# Patient Record
Sex: MALE | Race: WHITE | NOT HISPANIC OR LATINO | Employment: UNEMPLOYED | URBAN - NONMETROPOLITAN AREA
[De-identification: names, ages, dates, MRNs, and addresses within clinical notes are randomized per-mention and may not be internally consistent; named-entity substitution may affect disease eponyms.]

---

## 2018-03-19 ENCOUNTER — OFFICE VISIT (OUTPATIENT)
Dept: ORTHOPEDIC SURGERY | Facility: CLINIC | Age: 28
End: 2018-03-19

## 2018-03-19 VITALS — BODY MASS INDEX: 36.29 KG/M2 | WEIGHT: 245 LBS | RESPIRATION RATE: 18 BRPM | HEIGHT: 69 IN

## 2018-03-19 DIAGNOSIS — M79.641 RIGHT HAND PAIN: Primary | ICD-10-CM

## 2018-03-19 DIAGNOSIS — S62.314A CLOSED DISPLACED FRACTURE OF BASE OF FOURTH METACARPAL BONE OF RIGHT HAND, INITIAL ENCOUNTER: Primary | ICD-10-CM

## 2018-03-19 DIAGNOSIS — S62.316A CLOSED DISPLACED FRACTURE OF BASE OF FIFTH METACARPAL BONE OF RIGHT HAND, INITIAL ENCOUNTER: ICD-10-CM

## 2018-03-19 PROCEDURE — 29125 APPL SHORT ARM SPLINT STATIC: CPT | Performed by: PHYSICIAN ASSISTANT

## 2018-03-19 PROCEDURE — 99203 OFFICE O/P NEW LOW 30 MIN: CPT | Performed by: PHYSICIAN ASSISTANT

## 2018-03-19 RX ORDER — IBUPROFEN 800 MG/1
800 TABLET ORAL
COMMUNITY
End: 2022-02-01

## 2018-03-19 RX ORDER — OXYCODONE AND ACETAMINOPHEN 10; 325 MG/1; MG/1
TABLET ORAL
COMMUNITY
Start: 2018-03-15 | End: 2018-03-20
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
## 2021-10-11 ENCOUNTER — APPOINTMENT (OUTPATIENT)
Dept: GENERAL RADIOLOGY | Facility: HOSPITAL | Age: 31
End: 2021-10-11
Payer: MEDICAID

## 2021-10-11 ENCOUNTER — APPOINTMENT (OUTPATIENT)
Dept: CT IMAGING | Facility: HOSPITAL | Age: 31
End: 2021-10-11
Payer: MEDICAID

## 2021-10-11 ENCOUNTER — HOSPITAL ENCOUNTER (EMERGENCY)
Facility: HOSPITAL | Age: 31
Discharge: LEFT BEFORE DISCHARGE | End: 2021-10-11
Attending: EMERGENCY MEDICINE
Payer: MEDICAID

## 2021-10-11 VITALS
DIASTOLIC BLOOD PRESSURE: 73 MMHG | HEIGHT: 69 IN | TEMPERATURE: 99.5 F | SYSTOLIC BLOOD PRESSURE: 125 MMHG | BODY MASS INDEX: 37.03 KG/M2 | WEIGHT: 250 LBS | OXYGEN SATURATION: 90 % | HEART RATE: 86 BPM | RESPIRATION RATE: 15 BRPM

## 2021-10-11 DIAGNOSIS — E87.6 HYPOKALEMIA: ICD-10-CM

## 2021-10-11 DIAGNOSIS — K92.0 COFFEE GROUND EMESIS: Primary | ICD-10-CM

## 2021-10-11 LAB
A/G RATIO: 1.2 (ref 0.8–2)
A/G RATIO: 1.2 (ref 0.8–2)
ALBUMIN SERPL-MCNC: 3.7 G/DL (ref 3.4–4.8)
ALBUMIN SERPL-MCNC: 4 G/DL (ref 3.4–4.8)
ALP BLD-CCNC: 120 U/L (ref 25–100)
ALP BLD-CCNC: 135 U/L (ref 25–100)
ALT SERPL-CCNC: 24 U/L (ref 4–36)
ALT SERPL-CCNC: 25 U/L (ref 4–36)
AMORPHOUS: NORMAL /HPF
AMPHETAMINE SCREEN, URINE: POSITIVE
ANION GAP SERPL CALCULATED.3IONS-SCNC: 8 MMOL/L (ref 3–16)
ANION GAP SERPL CALCULATED.3IONS-SCNC: 9 MMOL/L (ref 3–16)
AST SERPL-CCNC: 22 U/L (ref 8–33)
AST SERPL-CCNC: 24 U/L (ref 8–33)
BARBITURATE SCREEN URINE: ABNORMAL
BASOPHILS ABSOLUTE: 0 K/UL (ref 0–0.1)
BASOPHILS ABSOLUTE: 0 K/UL (ref 0–0.1)
BASOPHILS RELATIVE PERCENT: 0.1 %
BASOPHILS RELATIVE PERCENT: 0.3 %
BENZODIAZEPINE SCREEN, URINE: POSITIVE
BILIRUB SERPL-MCNC: 0.3 MG/DL (ref 0.3–1.2)
BILIRUB SERPL-MCNC: 0.4 MG/DL (ref 0.3–1.2)
BILIRUBIN URINE: NEGATIVE
BLOOD, URINE: NEGATIVE
BUN BLDV-MCNC: 16 MG/DL (ref 6–20)
BUN BLDV-MCNC: 18 MG/DL (ref 6–20)
BUPRENORPHINE QUAL, URINE: ABNORMAL
CALCIUM SERPL-MCNC: 8.9 MG/DL (ref 8.5–10.5)
CALCIUM SERPL-MCNC: 9.9 MG/DL (ref 8.5–10.5)
CANNABINOID SCREEN URINE: POSITIVE
CHLORIDE BLD-SCNC: 101 MMOL/L (ref 98–107)
CHLORIDE BLD-SCNC: 101 MMOL/L (ref 98–107)
CLARITY: CLEAR
CO2: 34 MMOL/L (ref 20–30)
CO2: 35 MMOL/L (ref 20–30)
COCAINE METABOLITE SCREEN URINE: POSITIVE
COLOR: YELLOW
CREAT SERPL-MCNC: 1 MG/DL (ref 0.4–1.2)
CREAT SERPL-MCNC: 1 MG/DL (ref 0.4–1.2)
EOSINOPHILS ABSOLUTE: 0 K/UL (ref 0–0.4)
EOSINOPHILS ABSOLUTE: 0.1 K/UL (ref 0–0.4)
EOSINOPHILS RELATIVE PERCENT: 0 %
EOSINOPHILS RELATIVE PERCENT: 0.8 %
GFR AFRICAN AMERICAN: >59
GFR AFRICAN AMERICAN: >59
GFR NON-AFRICAN AMERICAN: >59
GFR NON-AFRICAN AMERICAN: >59
GLOBULIN: 3.2 G/DL
GLOBULIN: 3.4 G/DL
GLUCOSE BLD-MCNC: 102 MG/DL (ref 74–106)
GLUCOSE BLD-MCNC: 132 MG/DL (ref 74–106)
GLUCOSE URINE: NEGATIVE MG/DL
HCT VFR BLD CALC: 40.1 % (ref 40–54)
HCT VFR BLD CALC: 43.2 % (ref 40–54)
HEMOGLOBIN: 13.1 G/DL (ref 13–18)
HEMOGLOBIN: 14.1 G/DL (ref 13–18)
IMMATURE GRANULOCYTES #: 0.1 K/UL
IMMATURE GRANULOCYTES #: 0.1 K/UL
IMMATURE GRANULOCYTES %: 0.5 % (ref 0–5)
IMMATURE GRANULOCYTES %: 0.6 % (ref 0–5)
KETONES, URINE: NEGATIVE MG/DL
LACTIC ACID: 1.8 MMOL/L (ref 0.4–2)
LACTIC ACID: 1.8 MMOL/L (ref 0.4–2)
LEUKOCYTE ESTERASE, URINE: NEGATIVE
LIPASE: 24 U/L (ref 5.6–51.3)
LYMPHOCYTES ABSOLUTE: 1.5 K/UL (ref 1.5–4)
LYMPHOCYTES ABSOLUTE: 2.5 K/UL (ref 1.5–4)
LYMPHOCYTES RELATIVE PERCENT: 10.8 %
LYMPHOCYTES RELATIVE PERCENT: 17.7 %
Lab: ABNORMAL
MAGNESIUM: 1.9 MG/DL (ref 1.7–2.4)
MAGNESIUM: 1.9 MG/DL (ref 1.7–2.4)
MCH RBC QN AUTO: 28.2 PG (ref 27–32)
MCH RBC QN AUTO: 28.3 PG (ref 27–32)
MCHC RBC AUTO-ENTMCNC: 32.6 G/DL (ref 31–35)
MCHC RBC AUTO-ENTMCNC: 32.7 G/DL (ref 31–35)
MCV RBC AUTO: 86.4 FL (ref 80–100)
MCV RBC AUTO: 86.6 FL (ref 80–100)
METHADONE SCREEN, URINE: ABNORMAL
METHAMPHETAMINE, URINE: POSITIVE
MICROSCOPIC EXAMINATION: YES
MONOCYTES ABSOLUTE: 0.7 K/UL (ref 0.2–0.8)
MONOCYTES ABSOLUTE: 1.2 K/UL (ref 0.2–0.8)
MONOCYTES RELATIVE PERCENT: 5.1 %
MONOCYTES RELATIVE PERCENT: 8 %
NEUTROPHILS ABSOLUTE: 10.5 K/UL (ref 2–7.5)
NEUTROPHILS ABSOLUTE: 11.7 K/UL (ref 2–7.5)
NEUTROPHILS RELATIVE PERCENT: 73.7 %
NEUTROPHILS RELATIVE PERCENT: 82.4 %
NITRITE, URINE: NEGATIVE
OCCULT BLOOD, OTHER: ABNORMAL
OPIATE SCREEN URINE: POSITIVE
PDW BLD-RTO: 13.2 % (ref 11–16)
PDW BLD-RTO: 13.2 % (ref 11–16)
PH UA: 8.5 (ref 5–8)
PHENCYCLIDINE SCREEN URINE: ABNORMAL
PLATELET # BLD: 234 K/UL (ref 150–400)
PLATELET # BLD: 246 K/UL (ref 150–400)
PMV BLD AUTO: 11.1 FL (ref 6–10)
PMV BLD AUTO: 11.1 FL (ref 6–10)
POTASSIUM REFLEX MAGNESIUM: 3 MMOL/L (ref 3.4–5.1)
POTASSIUM REFLEX MAGNESIUM: 3.1 MMOL/L (ref 3.4–5.1)
PRO-BNP: 593 PG/ML (ref 0–900)
PROPOXYPHENE SCREEN, URINE: ABNORMAL
PROTEIN UA: 30 MG/DL
RAPID INFLUENZA  B AGN: NEGATIVE
RAPID INFLUENZA A AGN: NEGATIVE
RBC # BLD: 4.63 M/UL (ref 4.5–6)
RBC # BLD: 5 M/UL (ref 4.5–6)
RBC UA: NORMAL /HPF (ref 0–4)
SARS-COV-2, NAAT: NOT DETECTED
SODIUM BLD-SCNC: 143 MMOL/L (ref 136–145)
SODIUM BLD-SCNC: 145 MMOL/L (ref 136–145)
SPECIFIC GRAVITY UA: 1.02 (ref 1–1.03)
TOTAL PROTEIN: 6.9 G/DL (ref 6.4–8.3)
TOTAL PROTEIN: 7.4 G/DL (ref 6.4–8.3)
TRICYCLIC, URINE: ABNORMAL
TROPONIN: <0.3 NG/ML
TROPONIN: <0.3 NG/ML
UR OXYCODONE RAPID SCREEN: ABNORMAL
URINE REFLEX TO CULTURE: ABNORMAL
URINE TYPE: ABNORMAL
UROBILINOGEN, URINE: 0.2 E.U./DL
WBC # BLD: 14.1 K/UL (ref 4–11)
WBC # BLD: 14.3 K/UL (ref 4–11)
WBC UA: NORMAL /HPF (ref 0–5)

## 2021-10-11 PROCEDURE — 83880 ASSAY OF NATRIURETIC PEPTIDE: CPT

## 2021-10-11 PROCEDURE — 80053 COMPREHEN METABOLIC PANEL: CPT

## 2021-10-11 PROCEDURE — 74176 CT ABD & PELVIS W/O CONTRAST: CPT

## 2021-10-11 PROCEDURE — 83690 ASSAY OF LIPASE: CPT

## 2021-10-11 PROCEDURE — 74220 X-RAY XM ESOPHAGUS 1CNTRST: CPT

## 2021-10-11 PROCEDURE — 87635 SARS-COV-2 COVID-19 AMP PRB: CPT

## 2021-10-11 PROCEDURE — 6360000002 HC RX W HCPCS: Performed by: EMERGENCY MEDICINE

## 2021-10-11 PROCEDURE — 96374 THER/PROPH/DIAG INJ IV PUSH: CPT

## 2021-10-11 PROCEDURE — 96365 THER/PROPH/DIAG IV INF INIT: CPT

## 2021-10-11 PROCEDURE — 80307 DRUG TEST PRSMV CHEM ANLYZR: CPT

## 2021-10-11 PROCEDURE — 36415 COLL VENOUS BLD VENIPUNCTURE: CPT

## 2021-10-11 PROCEDURE — 71045 X-RAY EXAM CHEST 1 VIEW: CPT

## 2021-10-11 PROCEDURE — 99284 EMERGENCY DEPT VISIT MOD MDM: CPT

## 2021-10-11 PROCEDURE — 2580000003 HC RX 258: Performed by: EMERGENCY MEDICINE

## 2021-10-11 PROCEDURE — 6370000000 HC RX 637 (ALT 250 FOR IP)

## 2021-10-11 PROCEDURE — 87040 BLOOD CULTURE FOR BACTERIA: CPT

## 2021-10-11 PROCEDURE — C9113 INJ PANTOPRAZOLE SODIUM, VIA: HCPCS | Performed by: EMERGENCY MEDICINE

## 2021-10-11 PROCEDURE — 85025 COMPLETE CBC W/AUTO DIFF WBC: CPT

## 2021-10-11 PROCEDURE — 96372 THER/PROPH/DIAG INJ SC/IM: CPT

## 2021-10-11 PROCEDURE — 84484 ASSAY OF TROPONIN QUANT: CPT

## 2021-10-11 PROCEDURE — 93005 ELECTROCARDIOGRAM TRACING: CPT

## 2021-10-11 PROCEDURE — 87804 INFLUENZA ASSAY W/OPTIC: CPT

## 2021-10-11 PROCEDURE — 83735 ASSAY OF MAGNESIUM: CPT

## 2021-10-11 PROCEDURE — 96375 TX/PRO/DX INJ NEW DRUG ADDON: CPT

## 2021-10-11 PROCEDURE — 82271 OCCULT BLOOD OTHER SOURCES: CPT

## 2021-10-11 PROCEDURE — 6360000002 HC RX W HCPCS

## 2021-10-11 PROCEDURE — 81001 URINALYSIS AUTO W/SCOPE: CPT

## 2021-10-11 PROCEDURE — 83605 ASSAY OF LACTIC ACID: CPT

## 2021-10-11 PROCEDURE — 36556 INSERT NON-TUNNEL CV CATH: CPT

## 2021-10-11 RX ORDER — DROPERIDOL 2.5 MG/ML
2.5 INJECTION, SOLUTION INTRAMUSCULAR; INTRAVENOUS ONCE
Status: COMPLETED | OUTPATIENT
Start: 2021-10-11 | End: 2021-10-11

## 2021-10-11 RX ORDER — ONDANSETRON 4 MG/1
TABLET, ORALLY DISINTEGRATING ORAL
Status: COMPLETED
Start: 2021-10-11 | End: 2021-10-11

## 2021-10-11 RX ORDER — PANTOPRAZOLE SODIUM 40 MG/10ML
INJECTION, POWDER, LYOPHILIZED, FOR SOLUTION INTRAVENOUS
Status: DISCONTINUED
Start: 2021-10-11 | End: 2021-10-11 | Stop reason: HOSPADM

## 2021-10-11 RX ORDER — 0.9 % SODIUM CHLORIDE 0.9 %
1000 INTRAVENOUS SOLUTION INTRAVENOUS ONCE
Status: COMPLETED | OUTPATIENT
Start: 2021-10-11 | End: 2021-10-11

## 2021-10-11 RX ORDER — ONDANSETRON 2 MG/ML
INJECTION INTRAMUSCULAR; INTRAVENOUS
Status: DISCONTINUED
Start: 2021-10-11 | End: 2021-10-11 | Stop reason: HOSPADM

## 2021-10-11 RX ORDER — POTASSIUM CHLORIDE 7.45 MG/ML
20 INJECTION INTRAVENOUS ONCE
Status: COMPLETED | OUTPATIENT
Start: 2021-10-11 | End: 2021-10-11

## 2021-10-11 RX ORDER — MORPHINE SULFATE 4 MG/ML
4 INJECTION, SOLUTION INTRAMUSCULAR; INTRAVENOUS ONCE
Status: DISCONTINUED | OUTPATIENT
Start: 2021-10-11 | End: 2021-10-11 | Stop reason: HOSPADM

## 2021-10-11 RX ORDER — ACETAMINOPHEN 500 MG
TABLET ORAL
Status: COMPLETED
Start: 2021-10-11 | End: 2021-10-11

## 2021-10-11 RX ORDER — ONDANSETRON 2 MG/ML
4 INJECTION INTRAMUSCULAR; INTRAVENOUS ONCE
Status: COMPLETED | OUTPATIENT
Start: 2021-10-11 | End: 2021-10-11

## 2021-10-11 RX ORDER — ONDANSETRON 4 MG/1
4 TABLET, ORALLY DISINTEGRATING ORAL ONCE
Status: COMPLETED | OUTPATIENT
Start: 2021-10-11 | End: 2021-10-11

## 2021-10-11 RX ORDER — DROPERIDOL 2.5 MG/ML
INJECTION, SOLUTION INTRAMUSCULAR; INTRAVENOUS
Status: COMPLETED
Start: 2021-10-11 | End: 2021-10-11

## 2021-10-11 RX ORDER — ONDANSETRON 4 MG/1
TABLET, ORALLY DISINTEGRATING ORAL
Status: DISCONTINUED
Start: 2021-10-11 | End: 2021-10-11 | Stop reason: HOSPADM

## 2021-10-11 RX ORDER — ACETAMINOPHEN 500 MG
1000 TABLET ORAL ONCE
Status: COMPLETED | OUTPATIENT
Start: 2021-10-11 | End: 2021-10-11

## 2021-10-11 RX ADMIN — ONDANSETRON 4 MG: 4 TABLET, ORALLY DISINTEGRATING ORAL at 08:15

## 2021-10-11 RX ADMIN — SODIUM CHLORIDE 1000 ML: 9 INJECTION, SOLUTION INTRAVENOUS at 08:46

## 2021-10-11 RX ADMIN — SODIUM CHLORIDE 1000 ML: 9 INJECTION, SOLUTION INTRAVENOUS at 16:39

## 2021-10-11 RX ADMIN — POTASSIUM CHLORIDE 20 MEQ: 10 INJECTION, SOLUTION INTRAVENOUS at 16:37

## 2021-10-11 RX ADMIN — ONDANSETRON 4 MG: 2 INJECTION INTRAMUSCULAR; INTRAVENOUS at 08:47

## 2021-10-11 RX ADMIN — DROPERIDOL 2.5 MG: 2.5 INJECTION, SOLUTION INTRAMUSCULAR; INTRAVENOUS at 10:18

## 2021-10-11 RX ADMIN — ACETAMINOPHEN 1000 MG: 500 TABLET, FILM COATED ORAL at 16:39

## 2021-10-11 RX ADMIN — Medication 1000 MG: at 16:39

## 2021-10-11 RX ADMIN — SODIUM CHLORIDE 40 MG: 9 INJECTION, SOLUTION INTRAVENOUS at 08:47

## 2021-10-11 ASSESSMENT — PAIN SCALES - GENERAL
PAINLEVEL_OUTOF10: 0
PAINLEVEL_OUTOF10: 6

## 2021-10-11 NOTE — ED PROVIDER NOTES
[facility] ENCOUNTER      Pt Name: Brit Fu  MRN: [id number]  YOB: 1990  Date of evaluation: 10/11/2021  Provider: Abdoulaye Thompson MD    CHIEF COMPLAINT       Chief Complaint   Patient presents with    Emesis     C/o n/v,onset yesterday. HISTORY OF PRESENT ILLNESS  (Location/Symptom, Timing/Onset, Context/Setting, Quality, Duration, Modifying Factors, Severity.)   Brit Fu is a 27 y.o. male who presents to the emergency department with several complaints. Patient states that for the last 2 days he has had intermittent nausea vomiting associated with epigastric abdominal pain. Patient states that he has been vomiting so hard that he strained the muscles in his chest wall and has chest wall pain. He describes it as a pulling muscle sensation in his chest wall worse with changes in position and palpation. He states that he is not around others with similar complaints. Patient states that he felt that he had a low-grade temperature. Patient is a smoker. Patient denies any change in vision or speech. He states that he has a dry mouth and feels that he is dehydrated. Nursing notes were reviewed. REVIEW OFSYSTEMS    (2-9 systems for level 4, 10 or more for level 5)   ROS:  General: Body aches, generalized weakness, subjective fever  Cardiovascular: Chest wall pain that is reproducible with changes in position and palpation  Respiratory:  No shortness of breath, no cough, no wheezing  Gastrointestinal: Upper abdominal pain associated with nausea and vomiting  Musculoskeletal:  No muscle pain, no joint pain  Skin:  No rash, no easy bruising  Neurologic:  No speech problems, no headache, no extremity weakness  Psychiatric:  No anxiety  Genitourinary:  No dysuria, no hematuria    Except as noted above the remainder of the review of systems was reviewed and negative.        PAST MEDICAL HISTORY Past Medical History:   Diagnosis Date    Drug abuse     Hep C w/ coma, chronic ([facility] [address] 2015    No treatments at the present         SURGICAL HISTORY     No past surgical history on file. CURRENT MEDICATIONS       Previous Medications    IBUPROFEN (ADVIL;MOTRIN) 800 MG TABLET    Take 800 mg by mouth every 8 hours as needed. ALLERGIES     Patient has no known allergies. FAMILY HISTORY       Family History   Problem Relation Age of Onset    Diabetes Maternal Grandmother     No Known Problems Mother     No Known Problems Father     No Known Problems Sister     No Known Problems Brother           SOCIAL HISTORY       Social History     Socioeconomic History    Marital status: Single     Spouse name: Not on file    Number of children: Not on file    Years of education: Not on file    Highest education level: Not on file   Occupational History    Not on file   Tobacco Use    Smoking status: Current Every Day Smoker     Packs/day: 1.50     Years: 10.00     Pack years: 15.00    Smokeless tobacco: Never Used   Vaping Use    Vaping Use: Never used   Substance and Sexual Activity    Alcohol use: No    Drug use: Yes     Types: Marijuana     Comment: weekly    Sexual activity: Not on file   Other Topics Concern    Not on file   Social History Narrative    Not on file     Social Determinants of Health     Financial Resource Strain:     Difficulty of Paying Living Expenses:    Food Insecurity:     Worried About Running Out of Food in the Last Year:     Ran Out of Food in the Last Year:    Transportation Needs:     Lack of Transportation (Medical):      Lack of Transportation (Non-Medical):    Physical Activity:     Days of Exercise per Week:     Minutes of Exercise per Session:    Stress:     Feeling of Stress :    Social Connections:     Frequency of Communication with Friends and Family:     Frequency of Social Gatherings with Friends and Family:     Attends [employer] Services:  Active Member of Clubs or Organizations:     Attends Club or Organization Meetings:     Marital Status:    Intimate Partner Violence:     Fear of Current or Ex-Partner:     Emotionally Abused:     Physically Abused:     Sexually Abused:          PHYSICAL EXAM    (up to 7 for level 4, 8 or more for level 5)     ED Triage Vitals   BP Temp Temp Source Pulse Resp SpO2 Height Weight   10/11/21 0558 10/11/21 0558 10/11/21 0558 10/11/21 0558 10/11/21 0558 10/11/21 0558 10/11/21 0554 10/11/21 0554   (!) 146/85 99.5 °F (37.5 °C) Oral 90 16 94 % 5' 9\" (1.753 m) 250 lb (113.4 kg)       Physical Exam  General :Patient is awake, alert, oriented, in no acute distress, nontoxic appearing  HEENT: Pupils are equally round and reactive to light, EOMI, conjunctivae clear. Mild dry oral mucosa, no exudate. Uvula is midline  Neck: Neck is supple, full range of motion, trachea midline  Cardiac: Heart regular rate, rhythm, no murmurs, rubs, or gallops  Lungs: Lungs are clear to auscultation, there is no wheezing, rhonchi, or rales. There is no use of accessory muscles. Chest wall: There is reproducible chest wall pain on palpation that recreates the symptoms of brought the patient in the emergency department  Abdomen: Abdomen is soft. Epigastric abdominal pain on palpation that recreates his symptoms of brought the patient into the emergency department. Increased bowel sounds all 4 quadrants. Musculoskeletal: 5 out of 5 strength in all 4 extremities. No focal muscle deficits are appreciated  Neuro: Motor intact, sensory intact, level of consciousness is normal, cerebellar function is normal, reflexes are grossly normal. No evidence of incontinence or loss of bowel or bladder function, no saddle anesthesia noted   Dermatology: Skin is warm and dry  Psych: Mentation is grossly normal, cognition is grossly normal. Affect is appropriate.       DIAGNOSTIC RESULTS     EKG: All EKG's are interpreted by the Emergency Department Physician who either signs or Co-signs this chart in the [address] a cardiologist.    The EKG interpreted by me shows normal sinus rhythm rate of 75 per EDMD    RADIOLOGY:   Non-plain film images such as CT, Ultrasound and MRI are read by the radiologist. Plain radiographic images are visualized and preliminarily interpreted by the emergency physician with the below findings:      ? Radiologist's Report Reviewed:  XR CHEST PORTABLE    (Results Pending)   CT ABDOMEN PELVIS WO CONTRAST Additional Contrast? None    (Results Pending)         ED BEDSIDE ULTRASOUND:   Performed by ED Physician - none    LABS:    I have reviewed and interpreted all of the currently available lab results from this visit (ifapplicable):  Results for orders placed or performed during the hospital encounter of 10/11/21   Rapid Influenza A/B Antigens    Specimen: Nasopharyngeal   Result Value Ref Range    Rapid Influenza A Ag Negative Negative    Rapid Influenza B Ag Negative Negative   COVID-19, Rapid    Specimen: Nasopharyngeal Swab   Result Value Ref Range    SARS-CoV-2, NAAT Not Detected Not Detected        All other labs were within normal range or not returned as of this dictation.     EMERGENCY DEPARTMENT COURSE and DIFFERENTIAL DIAGNOSIS/MDM:   Vitals:    Vitals:    10/11/21 0554 10/11/21 0558   BP:  (!) 146/85   Pulse:  90   Resp:  16   Temp:  99.5 °F (37.5 °C)   TempSrc:  Oral   SpO2:  94%   Weight: 250 lb (113.4 kg)    Height: 5' 9\" (1.753 m)        MEDICATIONS ADMINISTERED IN ED:  Medications   0.9 % sodium chloride bolus (has no administration in time range)   pantoprazole (PROTONIX) 40 mg in sodium chloride 0.9 % 50 mL bolus (has no administration in time range)   ondansetron (ZOFRAN) injection 4 mg (has no administration in time range)   morphine injection 4 mg (has no administration in time range)   pantoprazole (PROTONIX) 40 MG injection (has no administration in time range)       Patient was placed in examination room at which time after exam was performed IV was obtained and labs are drawn. Patient was given 1 L IV normal saline bolus along with 40 mg of Protonix IV, 4 mg of Zofran IV and 4 mg of morphine IV. EKG revealed normal sinus rhythm rate of 75 per EDMD.  Patient was given 324 mg aspirin p.o. Patient was taken over for CT scan abdomen and pelvis without contrast.  Patient's influenza and COVID-19 results were negative. Patient's care was turned over to Dr. Jatin Layton pending CT scan abdomen and pelvis, labs, reexamination definitive care at shift change    0700  I have signed out [facility] Emergency Department care to Dr Jatin Layton. We discussed the pertinent history, physical exam, completed/pending test results (CT scan abdomen and pelvis, labs, reexamination definitive care) and current treatment plan. Please refer to his/her chart for the patients remaining Emergency Department course and final disposition. The patient will follow-up with their PCP in 1-2 days for reevaluation. If the patient or family members have anyfurther concerns or any worsening symptoms they will return to the ED for reevaluation. CONSULTS:  None    PROCEDURES:  Procedures    CRITICAL CARE TIME    Total Critical Care time was 0 minutes, excluding separately reportable procedures. There was a high probability of clinically significant/life threatening deterioration in the patient's condition which required my urgent intervention. FINAL IMPRESSION      1. Nausea and vomiting, intractability of vomiting not specified, unspecified vomiting type Stable   2. Abdominal pain, epigastric Stable   3. Chest wall pain Stable   4. Musculoskeletal pain Stable         DISPOSITION/PLAN   DISPOSITION        PATIENT REFERRED TO:  No follow-up provider specified.     DISCHARGE MEDICATIONS:  New Prescriptions    No medications on file       Comment: Please note this report has been produced using speech recognition software and may contain errorsrelated to that system including errors in grammar, punctuation, and spelling, as well as words and phrases that may be inappropriate. If there are any questions or concerns please feel free to contact the dictating providerfor clarification.     China Jarquin MD  Attending Emergency Physician              China Jarquin MD  10/11/21 [id number]

## 2021-10-11 NOTE — ED NOTES
Central line pulled at this time while pt is laying flat on back, immediate direct pressure held on site with Vaseline Gauze and bulky gauze, [place] PCT will continue to hold direct constant pressure x 20-30 minutes then RN will apply pressure dressing. Reviewed risks again with pt and girlfriend. Also reviewed what to do if pt started to bleed heavily from site, instructing her to call 911 while holding constant pressure with clean towel or something of those sorts.  Girl friend and pt verbalize understand     Robinson Clark RN  10/11/21 1951

## 2021-10-11 NOTE — ED NOTES
Dr. Olesya Gilliland, performed Arterial stick to obtain blood sample, obtained from Right Radial, +ALLENS test prior to stick, direct pressure held to area for 30-60 seconds. Dressing applied no distress noted.         Gia Clark RN  10/11/21 1936

## 2021-10-11 NOTE — ED PROVIDER NOTES
Emergency Department Encounter  Location: [address] [place] [address] Court    Patient: Elfego Gibson  MRN: [id number]  : 1990  Date of evaluation:   ED Provider: Carlos Enrique Hankins MD    8600  Elfego Gibson was checked out to me by Dr. Ciro Barroso. Please see his/her initial documentation for details of the patient's initial ED presentation, physical exam and completed studies. In brief, Elfego Gibson is a 27 y.o. male that presented to the emergency department with abdominal pain, nausea, vomiting and chest wall pain from vomiting. He denies IVDU but has Hep C.       I have reviewed and interpreted all of the currently available lab results and diagnostics from this visit:  Results for orders placed or performed during the hospital encounter of 10/11/21   Rapid Influenza A/B Antigens    Specimen: Nasopharyngeal   Result Value Ref Range    Rapid Influenza A Ag Negative Negative    Rapid Influenza B Ag Negative Negative   COVID-19, Rapid    Specimen: Nasopharyngeal Swab   Result Value Ref Range    SARS-CoV-2, NAAT Not Detected Not Detected   CBC Auto Differential   Result Value Ref Range    WBC 14.1 (H) 4.0 - 11.0 K/uL    RBC 5.00 4.50 - 6.00 M/uL    Hemoglobin 14.1 13.0 - 18.0 g/dL    Hematocrit 43.2 40.0 - 54.0 %    MCV 86.4 80.0 - 100.0 fL    MCH 28.2 27.0 - 32.0 pg    MCHC 32.6 31.0 - 35.0 g/dL    RDW 13.2 11.0 - 16.0 %    Platelets 243 818 - 892 K/uL    MPV 11.1 (H) 6.0 - 10.0 fL    Neutrophils % 82.4 %    Immature Granulocytes % 0.6 0.0 - 5.0 %    Lymphocytes % 10.8 %    Monocytes % 5.1 %    Eosinophils % 0.8 %    Basophils % 0.3 %    Neutrophils Absolute 11.7 (H) 2.0 - 7.5 K/uL    Immature Granulocytes # 0.1 K/uL    Lymphocytes Absolute 1.5 1.5 - 4.0 K/uL    Monocytes Absolute 0.7 0.2 - 0.8 K/uL    Eosinophils Absolute 0.1 0.0 - 0.4 K/uL    Basophils Absolute 0.0 0.0 - 0.1 K/uL   Comprehensive Metabolic Panel w/ Reflex to MG   Result Value Ref Range Sodium 145 136 - 145 mmol/L    Potassium reflex Magnesium 3.0 (L) 3.4 - 5.1 mmol/L    Chloride 101 98 - 107 mmol/L    CO2 35 (H) 20 - 30 mmol/L    Anion Gap 9 3 - 16    Glucose 132 (H) 74 - 106 mg/dL    BUN 18 6 - 20 mg/dL    CREATININE 1.0 0.4 - 1.2 mg/dL    GFR Non-African American >59 >59    GFR African American >59 >59    Calcium 9.9 8.5 - 10.5 mg/dL    Total Protein 7.4 6.4 - 8.3 g/dL    Albumin 4.0 3.4 - 4.8 g/dL    Albumin/Globulin Ratio 1.2 0.8 - 2.0    Total Bilirubin 0.4 0.3 - 1.2 mg/dL    Alkaline Phosphatase 135 (H) 25 - 100 U/L    ALT 25 4 - 36 U/L    AST 24 8 - 33 U/L    Globulin 3.4 Not Established g/dL   Lipase   Result Value Ref Range    Lipase 24.0 5.6 - 51.3 U/L   Urine Reflex to Culture    Specimen: Urine, clean catch   Result Value Ref Range    Color, UA Yellow Straw/Yellow    Clarity, UA Clear Clear    Glucose, Ur Negative Negative mg/dL    Bilirubin Urine Negative Negative    Ketones, Urine Negative Negative mg/dL    Specific Gravity, UA 1.020 1.005 - 1.030    Blood, Urine Negative Negative    pH, UA 8.5 (A) 5.0 - 8.0    Protein, UA 30 (A) Negative mg/dL    Urobilinogen, Urine 0.2 <2.0 E.U./dL    Nitrite, Urine Negative Negative    Leukocyte Esterase, Urine Negative Negative    Microscopic Examination YES     Urine Type NotGiven     Urine Reflex to Culture Not Indicated    Troponin   Result Value Ref Range    Troponin <0.30 <0.30 ng/mL   Brain Natriuretic Peptide   Result Value Ref Range    Pro- 0 - 900 pg/mL   Lactic Acid, Plasma   Result Value Ref Range    Lactic Acid 1.8 0.4 - 2.0 mmol/L   Drug Screen, Multiple, Urine   Result Value Ref Range    PCP Screen, Urine Neg Negative <25 ng/mL    Benzodiazepine Screen, Urine POSITIVE (A) Negative <300 ng/mL    Cocaine Metabolite Screen, Urine POSITIVE (A) Negative <300 ng/mL    Amphetamine Screen, Urine POSITIVE (A) Negative <1000 ng/mL    Cannabinoid Scrn, Ur POSITIVE (A) Negative <50 ng/mL    Opiate Scrn, Ur POSITIVE (A) Negative <300 ng/mL    Barbiturate Screen, Ur Neg Negative <556 ng/mL    Tricyclic Neg Negative <106 ng/mL    Methadone Screen, Urine Neg Negative <300 ng/ml    Propoxyphene Screen, Urine Neg Negative <300 ng/mL    Methamphetamine, Urine POSITIVE (A) Negative <1000 ng/mL    UR Oxycodone Rapid Screen Neg Negative <100 ng/mL    Buprenorphine Qual, Urine Neg Negative <25 ng/mL    Drug Screen Comment: see below    Magnesium   Result Value Ref Range    Magnesium 1.9 1.7 - 2.4 mg/dL   Troponin   Result Value Ref Range    Troponin <0.30 <0.30 ng/mL   CBC Auto Differential   Result Value Ref Range    WBC 14.3 (H) 4.0 - 11.0 K/uL    RBC 4.63 4.50 - 6.00 M/uL    Hemoglobin 13.1 13.0 - 18.0 g/dL    Hematocrit 40.1 40.0 - 54.0 %    MCV 86.6 80.0 - 100.0 fL    MCH 28.3 27.0 - 32.0 pg    MCHC 32.7 31.0 - 35.0 g/dL    RDW 13.2 11.0 - 16.0 %    Platelets 073 827 - 199 K/uL    MPV 11.1 (H) 6.0 - 10.0 fL    Neutrophils % 73.7 %    Immature Granulocytes % 0.5 0.0 - 5.0 %    Lymphocytes % 17.7 %    Monocytes % 8.0 %    Eosinophils % 0.0 %    Basophils % 0.1 %    Neutrophils Absolute 10.5 (H) 2.0 - 7.5 K/uL    Immature Granulocytes # 0.1 K/uL    Lymphocytes Absolute 2.5 1.5 - 4.0 K/uL    Monocytes Absolute 1.2 (H) 0.2 - 0.8 K/uL    Eosinophils Absolute 0.0 0.0 - 0.4 K/uL    Basophils Absolute 0.0 0.0 - 0.1 K/uL   Comprehensive Metabolic Panel w/ Reflex to MG   Result Value Ref Range    Sodium 143 136 - 145 mmol/L    Potassium reflex Magnesium 3.1 (L) 3.4 - 5.1 mmol/L    Chloride 101 98 - 107 mmol/L    CO2 34 (H) 20 - 30 mmol/L    Anion Gap 8 3 - 16    Glucose 102 74 - 106 mg/dL    BUN 16 6 - 20 mg/dL    CREATININE 1.0 0.4 - 1.2 mg/dL    GFR Non-African American >59 >59    GFR African American >59 >59    Calcium 8.9 8.5 - 10.5 mg/dL    Total Protein 6.9 6.4 - 8.3 g/dL    Albumin 3.7 3.4 - 4.8 g/dL    Albumin/Globulin Ratio 1.2 0.8 - 2.0    Total Bilirubin 0.3 0.3 - 1.2 mg/dL    Alkaline Phosphatase 120 (H) 25 - 100 U/L    ALT 24 4 - 36 U/L    AST 22 8 - 33 U/L    Globulin 3.2 Not Established g/dL   Lactic Acid, Plasma   Result Value Ref Range    Lactic Acid 1.8 0.4 - 2.0 mmol/L   Occult Blood Gastric / Duodenum   Result Value Ref Range    Occult Blood, Other Result: POSITIVE  Normal range: Negative   (A)    Magnesium   Result Value Ref Range    Magnesium 1.9 1.7 - 2.4 mg/dL   Microscopic Urinalysis   Result Value Ref Range    WBC, UA None seen 0 - 5 /HPF    RBC, UA None seen 0 - 4 /HPF    Amorphous, UA 1+ /HPF     CT ABDOMEN PELVIS WO CONTRAST Additional Contrast? None    Result Date: 10/11/2021  CT abdomen and pelvis without contrast HISTORY: Epigastric pain COMPARISON: none CONTRAST:  none  TECHNIQUE: Individualized dose optimization technique was used in order to meet ALARA standards for radiation dose reduction. In addition to vendor specific dose reduction algorithms, the dose reduction techniques vary based on the specific scanner utilized but frequently include automated exposure control, adjustment of the mA and/or kV according to patient size, and use of iterative reconstruction technique. COMMENTS: Degenerative changes in the spine. Lung bases are clear. No lymphadenopathy. Gallbladder is within normal limits. 4 x 2 mm left interpolar renal calculus is present. No hydronephrosis. Noncontrast evaluation of the solid abdominal organs otherwise unremarkable. Bladder and prostate within normal limits. Small and large bowel are unremarkable as is the appendix. There is wall thickening at the gastroesophageal junction, noting a small-moderate hiatal hernia. There is small focus of air eccentric to the central lumen of the esophagus on image 3-12 and given the clinical history consideration for esophageal tear (Boerhaave syndrome) must be considered. Further evaluation with Gastrografin esophagram or upper endoscopy should be considered; surgical consultation is recommended. Possible distal esophageal injury, noting that the CT findings are not conclusive. See comments. Small nonobstructing left renal calculus. FL ESOPHAGRAM    Result Date: 10/11/2021  History: Possible Boerhaave syndrome. Evaluate for esophageal leak. Water-soluble esophagram. FINDINGS: 2.5 minutes fluoroscopic time. 17 cine images obtained. FINDINGS: Esophageal contour and caliber appears to be within normal limits. No extravasation clearly identified. Gastric contour appears to be intact. The patient vomited the contrast and coronal imaging was limited due to inability to ingest additional  contrast and motion artifact. 1. No definite extravasation of contrast identified. Mild limitation as described. XR CHEST PORTABLE    Result Date: 10/11/2021  PORTABLE CHEST Indication: Line placement. Comparison: 7 hours prior Findings: Right IJ central venous catheter placement tip at the cavoatrial junction. Cardiomediastinal silhouette within normal limits. There is mild hazy bibasilar airspace disease, likely atelectasis with low lung volumes. No significant pleural effusion. No pneumothorax. Right IJ central venous catheter tip at the cavoatrial junction. XR CHEST PORTABLE    Result Date: 10/11/2021  Chest portable 0759 HISTORY: Chest wall pain     Low lung bones with bronchovascular crowding. The lungs are otherwise clear. Normal cardiomediastinal silhouette. Final ED Course and MDM: In brief, Ward Grier is a 27 y.o. male whose care was signed out to me by the outgoing provider. In brief, the patient was a difficult stick. At the time I assumed care, his labs had not been drawn and no IV was established. He was stuck multiple times for labs without success. I performed a right radial stick for labs. I placed a right antecubital IV using US. It was working but inconsistently. 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 It makes sense with the history and low grade fever.     There is wall thickening at the gastroesophageal junction, noting a small-moderate hiatal hernia. There is small focus of air eccentric to the central lumen of the esophagus on image 3-12 and given the clinical history consideration for esophageal tear   (Boerhaave syndrome) must be considered. Further evaluation with Gastrografin esophagram or upper endoscopy should be considered; surgical consultation is recommended.                   COVID negative  Flu negative    Mg normal  Lactic acid 1.8    Troponin negative  Lipase normal    CBC 14.1  CMP with potassium of 3.0. This was replaced in ED.    [address] for possible transfer. Dr. Silverio, , and Dr. Benja Mchugh, thoracic surgery had a conversation with me. Recommended Esophagram.  Would not accept the patient unless there was extravasation of contrast.  Esophagram was negative. Patient still appears very ill. He seems to be getting sicker while in the emergency department. Patient began to have coffee-ground emesis. It was Gastroccult positive. No IV access so central line placed by MD.    Procedure Note - Central Line:  The benefits, risks, and alternatives of central venous access were discussed with the  patient and Verbal consent was obtained for the procedure. Sabrina Hess was prepped and draped in standard bedside fashion in the right internal jugular area. Physical landmarks with ultrasound guidance was used to locate the central vein. Local anesthesia with 5ml of 1% lidocaine was injected. Seldinger technique was used for placement of the CVC in a non-pulsatile vasculature. All ports shane and flushed. The patient tolerated the procedure well and no acute complications occurred. EKG--  NSR  Rate of 78  No acute changes    Patient observed in the ED. No UOP after 9 hours in the ED  Still looks sick despite no diagnosis. Will repeat labs. [facility] for transfer. [address] is at full capacity.    3298  I spoke to Dr. Judeen Mohs, for [facility]. Joana Nagy. He wanted me to speak to GI.    1635 Dr. Domenica Rajput, hospitalist at [facility], accepted to Med Surg Tele. GI is aware of the patient. Patient did not give a urine until approximately 5 PM.  It was sent for urine drug screen. UDS was positive for cocaine, benzos, marijuana, opiates, and amphetamines/meth. He denied drug use. Patient did not get morphine in our emergency department although it was ordered. It was held so the opiate positive is not from any order given here. He did not get any benzodiazepines while in the emergency department. UA was negative. 1900  I have signed out [facility] Emergency Department care to Dr. Yared Horowitz. We discussed the pertinent history, physical exam, completed/pending test results (if applicable) and current treatment plan. Please refer to his/her chart for the patients remaining Emergency Department course and final disposition.     ED Medication Orders (From admission, onward)    Start Ordered     Status Ordering Provider    10/11/21 1645 10/11/21 1636  acetaminophen (TYLENOL) tablet 1,000 mg  ONCE      Last MAR action: Given - by Karin Caldera on 10/11/21 at [facility]    56/66/21 0286 10/11/21 1636  0.9 % sodium chloride bolus  ONCE      Last MAR action: Stopped - by ANGY KNOX on 10/11/21 at [place] [address], [place]    31/94/91 1809 10/11/21 1155  potassium chloride 10 mEq/100 mL IVPB (Peripheral Line)  ONCE      Last MAR action: Stopped - by ANGY KNOX on 10/11/21 at [facility]    51/79/22 7219 10/11/21 1036  ondansetron (ZOFRAN-ODT) disintegrating tablet 4 mg  ONCE      Last MAR action: Canceled Entry - by Karin Caldera on 10/11/21 at [facility]    46/75/19 5310 10/11/21 1036  droperidol (INAPSINE) injection 2.5 mg  ONCE      Last MAR action: Given - by ANGY KNOX on 10/11/21 at [facility]    03/73/47 0931 10/11/21 1013  ondansetron (ZOFRAN) 4 MG/2ML injection     Note to Pharmacy: Eduardo Angy: cabinet override    Last MAR action: Canceled Entry - by Nader Gee on 10/11/21 at 444 [id number]     10/11/21 0640 10/11/21 0640  pantoprazole (PROTONIX) 40 MG injection     Note to Pharmacy: Darlyn Peaks: cabinet override    Last MAR action: Canceled Entry - by Nader Gee on 10/11/21 at [address] [facility] [address]     10/11/21 0630 10/11/21 0615  0.9 % sodium chloride bolus  ONCE      Last MAR action: Stopped - by EDUARDO ANGY on 10/11/21 at [address]    10/11/21 0630 10/11/21 0615  pantoprazole (PROTONIX) 40 mg in sodium chloride 0.9 % 50 mL bolus  ONCE      Last MAR action: Stopped - by EDUARDO ANGY on 10/11/21 at 0920 [facility]    10/11/21 0630 10/11/21 0615  ondansetron (ZOFRAN) injection 4 mg  ONCE      Last MAR action: Given - by EDUARDO ANGY on 10/11/21 at 0847 [facility]    10/11/21 0630 10/11/21 0615  morphine injection 4 mg  ONCE      Last MAR action: Held - by Nader Gee on 10/11/21 at [address]          Final Impression      1. Coffee ground emesis    2.  Hypokalemia        DISPOSITION       (Please note that portions of this note may have been completed with a voice recognition program. Efforts were made to edit the dictations but occasionally words are mis-transcribed.)    Jose D Wilks MD  [address] [place] [address] MD Dylon  10/11/21 0585

## 2021-10-11 NOTE — ED NOTES
IV attempted x3 unsuccessful per RN, called for other RN from Medical Unit to come attempted IV.        Gia Clark RN  10/11/21 1937

## 2021-10-11 NOTE — ED NOTES
Dr. Chow Patience finished with CVC line placement. Pt tolerated well. Dr. Chow Patience advised blood return from all three ports and was US guided, ready to use.         Gia Clark RN  10/11/21 3772

## 2021-10-11 NOTE — ED NOTES
Received care from [address] [place] [address], advised that no order have been completed due to unable to obtain IV access. Reports she attempted once.  Per Report pt is suspected to be withdrawling from [employer], RN  10/11/21 5571       Gia Clark, RN  10/11/21 0068

## 2021-10-11 NOTE — ED NOTES
Dr. Jacinta Herrera started procedure for central line placement using sterile technique.       Gia Clark RN  10/11/21 0718

## 2021-10-11 NOTE — ED NOTES
Pressure dressing applied at this time time, no active bleeding visible on dressing. Will remain flat for 10 more minutes.        Gia lCark RN  10/11/21 1958

## 2021-10-12 NOTE — ED NOTES
Dr. Caro Henson reviewed risks of leaving AMA, risk that he could still have possible esophageal tear, that is upper GI will get worse, and the risks of emboli and bleeding, ect from CVC. Pt verbalize understand and report that he had been drink coke all day had julia 3 cups despite instructing to remain NPO. And that he is an adult and knows.       Gia Clark RN  10/11/21 2006

## 2021-10-12 NOTE — ED NOTES
Into room to assess how pt is tolerating fluids, asked for another updated, informed that there is still no change still waiting for beds, and that is will most likely be at lease tomorrow  Before a bed is available. Pt immediately set up in bed, and states \"well get this stuff off cause I want to go home, I cant handle this its making me to anxious being in this bed, I need to smoke, I know I need to stay but I have to go\"  Informed pt that we could get him something for anxiety and assess for withdrawals, and get him a nicotine patch. Pt declined multiple times.  Dr. Asha Jackson made aware, and win to talk with pt.      Osmel Clark RN  10/11/21 2002

## 2021-10-12 NOTE — ED NOTES
Followed up with Thony Majano on 10/11/2021 at 8:21 PM. Patient left the ED with a disposition of AMA on . Lani Penn Advised patient to follow up with a primary care physician or return to the Emergency Department if symptoms worsen. No blood was noted on pressure dressing and aware to keep in place for tonight.      KENYATTA Rowe RN  10/11/21 2022

## 2021-10-15 LAB — BLOOD CULTURE, ROUTINE: NORMAL

## 2021-10-26 RX ORDER — ONDANSETRON 4 MG/1
4 TABLET, FILM COATED ORAL EVERY 6 HOURS PRN
COMMUNITY
End: 2022-02-01

## 2021-10-26 RX ORDER — OMEPRAZOLE 20 MG/1
20 CAPSULE, DELAYED RELEASE ORAL DAILY
COMMUNITY
End: 2022-02-01 | Stop reason: SDUPTHER

## 2022-02-01 ENCOUNTER — OFFICE VISIT (OUTPATIENT)
Dept: GASTROENTEROLOGY | Facility: CLINIC | Age: 32
End: 2022-02-01

## 2022-02-01 VITALS
SYSTOLIC BLOOD PRESSURE: 173 MMHG | WEIGHT: 315 LBS | TEMPERATURE: 97.8 F | BODY MASS INDEX: 46.65 KG/M2 | DIASTOLIC BLOOD PRESSURE: 109 MMHG | HEIGHT: 69 IN

## 2022-02-01 DIAGNOSIS — F12.90 MARIJUANA USE: ICD-10-CM

## 2022-02-01 DIAGNOSIS — K21.9 GASTROESOPHAGEAL REFLUX DISEASE, UNSPECIFIED WHETHER ESOPHAGITIS PRESENT: ICD-10-CM

## 2022-02-01 DIAGNOSIS — K92.0 HEMATEMESIS WITH NAUSEA: Primary | ICD-10-CM

## 2022-02-01 DIAGNOSIS — K44.9 HIATAL HERNIA: ICD-10-CM

## 2022-02-01 DIAGNOSIS — E66.01 CLASS 3 SEVERE OBESITY WITH BODY MASS INDEX (BMI) OF 50.0 TO 59.9 IN ADULT, UNSPECIFIED OBESITY TYPE, UNSPECIFIED WHETHER SERIOUS COMORBIDITY PRESENT: ICD-10-CM

## 2022-02-01 PROCEDURE — 99204 OFFICE O/P NEW MOD 45 MIN: CPT | Performed by: PHYSICIAN ASSISTANT

## 2022-02-01 RX ORDER — OMEPRAZOLE 40 MG/1
40 CAPSULE, DELAYED RELEASE ORAL
Qty: 60 CAPSULE | Refills: 1 | Status: SHIPPED | OUTPATIENT
Start: 2022-02-01 | End: 2022-03-03 | Stop reason: SDUPTHER

## 2022-02-01 RX ORDER — OMEPRAZOLE 40 MG/1
40 CAPSULE, DELAYED RELEASE ORAL
Qty: 60 CAPSULE | Refills: 3 | Status: SHIPPED | OUTPATIENT
Start: 2022-02-01 | End: 2022-02-01

## 2022-02-01 RX ORDER — SODIUM CHLORIDE 9 MG/ML
30 INJECTION, SOLUTION INTRAVENOUS CONTINUOUS PRN
Status: CANCELLED | OUTPATIENT
Start: 2022-02-01

## 2022-02-01 RX ORDER — PROMETHAZINE HYDROCHLORIDE 12.5 MG/1
12.5 TABLET ORAL EVERY 6 HOURS PRN
Qty: 30 TABLET | Refills: 0 | Status: SHIPPED | OUTPATIENT
Start: 2022-02-01 | End: 2022-03-03 | Stop reason: SDUPTHER

## 2022-02-01 NOTE — PATIENT INSTRUCTIONS
Increase omeprazole to 40 mg BID  Stop all soda, avoid all caffeine  Drink only water  Stop smoking  Cannabinoid hyperemesis syndrome      Food Choices for Gastroesophageal Reflux Disease, Adult  When you have gastroesophageal reflux disease (GERD), the foods you eat and your eating habits are very important. Choosing the right foods can help ease your discomfort. Think about working with a food expert (dietitian) to help you make good choices.  What are tips for following this plan?  Reading food labels  · Look for foods that are low in saturated fat. Foods that may help with your symptoms include:  ? Foods that have less than 5% of daily value (DV) of fat.  ? Foods that have 0 grams of trans fat.  Cooking  · Do not dueñas your food.  · Cook your food by baking, steaming, grilling, or broiling. These are all methods that do not need a lot of fat for cooking.  · To add flavor, try to use herbs that are low in spice and acidity.  Meal planning    · Choose healthy foods that are low in fat, such as:  ? Fruits and vegetables.  ? Whole grains.  ? Low-fat dairy products.  ? Lean meats, fish, and poultry.  · Eat small meals often instead of eating 3 large meals each day. Eat your meals slowly in a place where you are relaxed. Avoid bending over or lying down until 2-3 hours after eating.  · Limit high-fat foods such as fatty meats or fried foods.  · Limit your intake of fatty foods, such as oils, butter, and shortening.  · Avoid the following as told by your doctor:  ? Foods that cause symptoms. These may be different for different people. Keep a food diary to keep track of foods that cause symptoms.  ? Alcohol.  ? Drinking a lot of liquid with meals.  ? Eating meals during the 2-3 hours before bed.    Lifestyle  · Stay at a healthy weight. Ask your doctor what weight is healthy for you. If you need to lose weight, work with your doctor to do so safely.  · Exercise for at least 30 minutes on 5 or more days each week, or as  told by your doctor.  · Wear loose-fitting clothes.  · Do not smoke or use any products that contain nicotine or tobacco. If you need help quitting, ask your doctor.  · Sleep with the head of your bed higher than your feet. Use a wedge under the mattress or blocks under the bed frame to raise the head of the bed.  · Chew sugar-free gum after meals.  What foods should eat?    Eat a healthy, well-balanced diet of fruits, vegetables, whole grains, low-fat dairy products, lean meats, fish, and poultry. Each person is different.  Foods that may cause symptoms in one person may not cause any symptoms in another person. Work with your doctor to find foods that are safe for you.  The items listed above may not be a complete list of what you can eat and drink. Contact a food expert for more options.  What foods should I avoid?  Limiting some of these foods may help in managing the symptoms of GERD. Everyone is different. Talk with a food expert or your doctor to help you find the exact foods to avoid, if any.  Fruits  Any fruits prepared with added fat. Any fruits that cause symptoms. For some people, this may include citrus fruits, such as oranges, grapefruit, pineapple, and wyatt.  Vegetables  Deep-fried vegetables. French fries. Any vegetables prepared with added fat. Any vegetables that cause symptoms. For some people, this may include tomatoes and tomato products, chili peppers, onions and garlic, and horseradish.  Grains  Pastries or quick breads with added fat.  Meats and other proteins  High-fat meats, such as fatty beef or pork, hot dogs, ribs, ham, sausage, salami, and hartley. Fried meat or protein, including fried fish and fried chicken. Nuts and nut butters, in large amounts.  Dairy  Whole milk and chocolate milk. Sour cream. Cream. Ice cream. Cream cheese. Milkshakes.  Fats and oils  Butter. Margarine. Shortening. Ghee.  Beverages  Coffee and tea, with or without caffeine. Carbonated beverages. Sodas. Energy  drinks. Fruit juice made with acidic fruits, such as orange or grapefruit. Tomato juice. Alcoholic drinks.  Sweets and desserts  Chocolate and cocoa. Donuts.  Seasonings and condiments  Pepper. Peppermint and spearmint. Added salt. Any condiments, herbs, or seasonings that cause symptoms. For some people, this may include barnes, hot sauce, or vinegar-based salad dressings.  The items listed above may not be a complete list of what you should not eat and drink. Contact a food expert for more options.  Questions to ask your doctor  Diet and lifestyle changes are often the first steps that are taken to manage symptoms of GERD. If diet and lifestyle changes do not help, talk with your doctor about taking medicines.  Where to find more information  · International Foundation for Gastrointestinal Disorders: aboutgerd.org  Summary  · When you have GERD, food and lifestyle choices are very important in easing your symptoms.  · Eat small meals often instead of 3 large meals a day. Eat your meals slowly and in a place where you are relaxed.  · Avoid bending over or lying down until 2-3 hours after eating.  · Limit high-fat foods such as fatty meats or fried foods.  This information is not intended to replace advice given to you by your health care provider. Make sure you discuss any questions you have with your health care provider.  Document Revised: 06/28/2021 Document Reviewed: 06/28/2021  [employer] Patient Education © 2021 [employer]

## 2022-02-01 NOTE — PROGRESS NOTES
New Patient Consult      Date: 2022   Patient Name: Sergei Molina  MRN: [id number]  : 1990     Primary Care Provider: Nel Carpenter APRN  Referring Provider: Alannah    Chief Complaint   Patient presents with   • Vomiting     Patient complains of pain in the top of his ribs and complains of throwing up blood.     History of Present Illness: Sergei Molina is a 31 y.o. male who is here today as a consultation with Gastroenterology for evaluation of Vomiting (Patient complains of pain in the top of his ribs and complains of throwing up blood.)    He has been having several episodes of vomiting per day which is mostly bile and also some episodes of vomiting with black material with some bright red blood. This has been occurring for the past 3 days. Vomiting is large in volume and all liquid, at first had some food mixed in but has not been able to eat. He tries to drink Sprite or pedialyte and still with vomiting. This same thing happened to him approx 2-4 months ago and lasted for 1 week until it resolved. He was seen in the hospital ED at [facility] in [place], [place] and was told that he had a hiatal hernia. Has been taking Omeprazole 20 mg once daily without relief of vomiting. Zofran does not help with nausea. He does smoke cigarettes 2 ppd and also smokes marijuana daily. He has smoked marijuana daily since he was a teenager. He feels that it helps his nausea some and helps him to sleep at night. He does not drink alcohol. He drinks soda daily.     Never had EGD. He had some radiology test at [facility] and that is how he was diagnosed with hiatal hernia, he is not sure what the test was. Also had previous labs, uncertain of the results. No prior surgeries. There is no known family history of GI disease or colon cancer or colon polyps.    Subjective      Past Medical History:   Diagnosis Date   • Back ache    • Diaphragmatic hernia    • Furuncle of left lower limb    •  Furuncle of left lower limb    • Joint pain      History reviewed. No pertinent surgical history.     Family History   Problem Relation Age of Onset   • Cancer Other    • Diabetes Other      Social History     Socioeconomic History   • Marital status: Single   Tobacco Use   • Smoking status: Current Every Day Smoker     Packs/day: 2.00     Years: 10.00     Pack years: 20.00   • Smokeless tobacco: Never Used   Substance and Sexual Activity   • Alcohol use: No   • Drug use: No   • Sexual activity: Defer     Current Outpatient Medications:   •  omeprazole (priLOSEC) 20 MG capsule, Take 20 mg by mouth Daily., Disp: , Rfl:     Allergies   Allergen Reactions   • Penicillins Other (See Comments)     To be asked to patient, what reaction.     The following portions of the patient's history were reviewed and updated as appropriate: allergies, current medications, past family history, past medical history, past social history, past surgical history and problem list.    Objective     Physical Exam  Vitals reviewed.   Constitutional:       General: He is not in acute distress.     Appearance: Normal appearance. He is well-developed. He is obese. He is not ill-appearing or diaphoretic.   HENT:      Head: Normocephalic and atraumatic.      Right Ear: External ear normal.      Left Ear: External ear normal.      Nose: Nose normal.      Mouth/Throat:      Comments: Wearing a mask  Eyes:      General: No scleral icterus.        Right eye: No discharge.         Left eye: No discharge.      Conjunctiva/sclera: Conjunctivae normal.      Comments: Droopy eyelids, bilateral   Neck:      Vascular: No JVD.   Cardiovascular:      Rate and Rhythm: Normal rate and regular rhythm.      Heart sounds: Normal heart sounds. No murmur heard.  No friction rub. No gallop.    Pulmonary:      Effort: Pulmonary effort is normal. No respiratory distress.      Breath sounds: Normal breath sounds. No wheezing or rales.   Chest:      Chest wall: No  "tenderness.   Abdominal:      General: Bowel sounds are normal. There is no distension.      Palpations: Abdomen is soft. There is no mass.      Tenderness: There is abdominal tenderness (epigastric, mild). There is no guarding.   Musculoskeletal:         General: No deformity. Normal range of motion.      Cervical back: Normal range of motion.   Skin:     General: Skin is warm and dry.      Findings: No erythema or rash.      Comments: tattoos   Neurological:      Mental Status: He is alert and oriented to person, place, and time.      Coordination: Coordination normal.   Psychiatric:         Behavior: Behavior normal.         Thought Content: Thought content normal.         Judgment: Judgment normal.      Comments: Flat affect       Vitals:    02/01/22 1506   BP: (!) 173/109   BP Location: Left arm   Patient Position: Sitting   Cuff Size: Adult   Temp: 97.8 °F (36.6 °C)   Weight: (!) 154 kg (340 lb 9.6 oz)   Height: 175.3 cm (69\")     Results Review:   I have reviewed the patient's new clinical and imaging results.    Labs 10/2021:   Phencyclidine (PCP), Urine   Negative <25 ng/mL Neg    External Benzodiazepine Screen Urine   Negative <300 ng/mL POSITIVE Abnormal     Cocaine Screen, Urine   Negative <300 ng/mL POSITIVE Abnormal     Amphetamine, Urine Qual   Negative <1000 ng/mL POSITIVE Abnormal     THC Screen, Urine   Negative <50 ng/mL POSITIVE Abnormal     External Opiates Screen Urine   Negative <300 ng/mL POSITIVE Abnormal     External Barbiturate Screen Urine   Negative <200 ng/mL Neg    External Tricyclics Screen Urine   Negative <300 ng/mL Neg    External Methadone Screen Urine   Negative <300 ng/ml Neg    External Propoxyphene Screen Urine   Negative <300 ng/mL Neg    External Methamphetamine Screen Urine   Negative <1000 ng/mL POSITIVE Abnormal     Oxycodone Screen, Urine   Negative <100 ng/mL Neg    Buprenorphine Qual, Urine   Negative <25 ng/mL Neg      Potassium 3.1, Cr 1.0, alk phos 120, ALT 24, AST " 22, Bili 0.3.  WBC 14.3, Hgb 13.1, HCT 40.1 platelets 234,000.    10/11/2021 esophagram at [facility]:  Water-soluble esophagram.  FINDINGS:   2.5 minutes fluoroscopic time. 17 cine images obtained.   FINDINGS: Esophageal contour and caliber appears to be within normal limits. No extravasation clearly identified. Gastric contour appears to be intact. The patient vomited the contrast and coronal imaging was limited due to inability to ingest additional    contrast and motion artifact.   IMPRESSION:   1. No definite extravasation of contrast identified. Mild limitation as described.    CT abdomen and pelvis without contrast 10/2021:  HISTORY: Epigastric pain   COMPARISON: none   CONTRAST:  none     COMMENTS:   Degenerative changes in the spine. Lung bases are clear. No lymphadenopathy. Gallbladder is within normal limits. 4 x 2 mm left interpolar renal calculus is present. No hydronephrosis. Noncontrast evaluation of the solid abdominal organs otherwise   unremarkable. Bladder and prostate within normal limits.   Small and large bowel are unremarkable as is the appendix.   There is wall thickening at the gastroesophageal junction, noting a small-moderate hiatal hernia. There is small focus of air eccentric to the central lumen of the esophagus on image 3-12 and given the clinical history consideration for esophageal tear   (Boerhaave syndrome) must be considered. Further evaluation with Gastrografin esophagram or upper endoscopy should be considered; surgical consultation is recommended.   IMPRESSION:   Possible distal esophageal injury, noting that the CT findings are not conclusive. See comments.     Assessment / Plan      1. Hematemesis with nausea  2. Marijuana use  3. Hiatal hernia  4. Gastroesophageal reflux disease, unspecified whether esophagitis present  He has been having several episodes of vomiting per day which is mostly bile and also some episodes of vomiting with black material with some bright red blood. This  has been occurring for the past 3 days. Similar symptoms were present approx 2-4 months ago and lasted for 1 week until it resolved. He was seen in the hospital ED at [facility] in [place], [place] and was told that he had a hiatal hernia, on my review of records there was concern for Boerhaave syndrome 10/2021 during his last episode. Esophagram following that CTAP did not show any worrisome findings but was limited due to vomiting with the contrast.     Episodes of severe vomiting seem likely related to marijuana use, GERD likely also contributing. Reviewed recent records from external facility including abnormal UDS with several substances.     Stop marijuana and other illicit drug use  Cut back on smoking cigarettes  Stop all soda intake  Anti-reflux diet given  Start phenergan as needed for relief of severe nausea and vomiting  Increase omeprazole 40 mg to BID for treatment of contributing GERD  EGD will be arranged as soon as possible, report back to ED if hematemesis continues or worsens    - promethazine (PHENERGAN) 12.5 MG tablet; Take 1 tablet by mouth Every 6 (Six) Hours As Needed for Nausea or Vomiting.  Dispense: 30 tablet; Refill: 0  - omeprazole (priLOSEC) 40 MG capsule; Take 1 capsule by mouth 2 (Two) Times a Day Before Meals.  Dispense: 60 capsule; Refill: 1    He will need an esophagogastroduodenoscopy performed with monitored anesthesia care. The indications, technique, alternatives and potential risk and complications were discussed with the patient including but not limited to bleeding, intestinal perforations, missing lesions and anesthetic complications. The patient understands and wishes to proceed with the procedure and has given their verbal consent. Written patient education information was given to the patient. He should follow up in the office after this procedure to discuss the results and further recommendations can be made at that time. The patient will call if they have further  questions before procedure.  - Case Request    5. Obesity with BMI 50  He is at risk for development of fatty liver disease with severely elevated BMI. He has not lost any weight with current GI symptoms. He should work on diet modification and weight loss to achieve healthy BMI. Mediterranean diet will be recommended after acute symptoms resolved. Recent labs show normal AST and ALT.           Follow Up:   Return for follow up after procedure to discuss results.      Ladonna Alvarado PA-C  [facility] [place]  2/1/2022  16:12 EST    Please note that portions of this note may have been completed with a voice recognition program. Efforts were made to edit the dictations, but occasionally words are mistranscribed.

## 2022-02-22 ENCOUNTER — TELEPHONE (OUTPATIENT)
Dept: GASTROENTEROLOGY | Facility: CLINIC | Age: 32
End: 2022-02-22

## 2022-02-23 ENCOUNTER — TELEPHONE (OUTPATIENT)
Dept: GASTROENTEROLOGY | Facility: CLINIC | Age: 32
End: 2022-02-23

## 2022-02-23 NOTE — TELEPHONE ENCOUNTER
Chen Madsen MA Chasteen, Sara, PA-C Just FYI, the patient is now going to  and does not wish to schedule a procedure here.

## 2022-02-23 NOTE — TELEPHONE ENCOUNTER
Spoke to patient's GF.  Patient was to be scheduled for EGD.  She states the patient is now going to  and does not wish to schedule a procedure here.  Provider informed.

## 2022-03-03 DIAGNOSIS — K44.9 HIATAL HERNIA: ICD-10-CM

## 2022-03-03 DIAGNOSIS — K21.9 GASTROESOPHAGEAL REFLUX DISEASE, UNSPECIFIED WHETHER ESOPHAGITIS PRESENT: ICD-10-CM

## 2022-03-03 DIAGNOSIS — K92.0 HEMATEMESIS WITH NAUSEA: ICD-10-CM

## 2022-03-03 RX ORDER — OMEPRAZOLE 40 MG/1
40 CAPSULE, DELAYED RELEASE ORAL DAILY
Qty: 30 CAPSULE | Refills: 1 | Status: SHIPPED | OUTPATIENT
Start: 2022-03-03

## 2022-03-03 RX ORDER — PROMETHAZINE HYDROCHLORIDE 12.5 MG/1
12.5 TABLET ORAL EVERY 6 HOURS PRN
Qty: 30 TABLET | Refills: 0 | Status: SHIPPED | OUTPATIENT
Start: 2022-03-03

## 2022-05-13 DIAGNOSIS — K21.9 GASTROESOPHAGEAL REFLUX DISEASE, UNSPECIFIED WHETHER ESOPHAGITIS PRESENT: ICD-10-CM

## 2022-05-13 DIAGNOSIS — K44.9 HIATAL HERNIA: ICD-10-CM

## 2022-05-13 DIAGNOSIS — K92.0 HEMATEMESIS WITH NAUSEA: ICD-10-CM

## 2022-05-13 RX ORDER — OMEPRAZOLE 40 MG/1
40 CAPSULE, DELAYED RELEASE ORAL DAILY
Qty: 30 CAPSULE | Refills: 1 | OUTPATIENT
Start: 2022-05-13

## 2024-03-30 ENCOUNTER — HOSPITAL ENCOUNTER (EMERGENCY)
Facility: HOSPITAL | Age: 34
Discharge: ANOTHER HOSPITAL | End: 2024-03-31
Attending: EMERGENCY MEDICINE
Payer: MEDICAID
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
## 2024-03-30 DIAGNOSIS — S06.2X2A: ICD-10-CM

## 2024-03-30 DIAGNOSIS — S09.90XA INJURY OF HEAD, INITIAL ENCOUNTER: Primary | ICD-10-CM

## 2024-03-30 PROCEDURE — 31500 INSERT EMERGENCY AIRWAY: CPT

## 2024-03-30 PROCEDURE — 31720 CLEARANCE OF AIRWAYS: CPT

## 2024-03-30 PROCEDURE — 99285 EMERGENCY DEPT VISIT HI MDM: CPT

## 2024-03-30 RX ORDER — MIDAZOLAM HYDROCHLORIDE 1 MG/ML
INJECTION, SOLUTION INTRAVENOUS
Status: DISCONTINUED
Start: 2024-03-30 | End: 2024-03-31 | Stop reason: WASHOUT

## 2024-03-30 ASSESSMENT — PULMONARY FUNCTION TESTS: PIF_VALUE: 23.4

## 2024-03-31 ENCOUNTER — APPOINTMENT (OUTPATIENT)
Dept: GENERAL RADIOLOGY | Facility: HOSPITAL | Age: 34
End: 2024-03-31
Attending: HOSPITALIST
Payer: MEDICAID

## 2024-03-31 VITALS
HEART RATE: 118 BPM | OXYGEN SATURATION: 95 % | RESPIRATION RATE: 39 BRPM | DIASTOLIC BLOOD PRESSURE: 54 MMHG | SYSTOLIC BLOOD PRESSURE: 184 MMHG

## 2024-03-31 PROCEDURE — 72170 X-RAY EXAM OF PELVIS: CPT

## 2024-03-31 PROCEDURE — 2500000003 HC RX 250 WO HCPCS: Performed by: EMERGENCY MEDICINE

## 2024-03-31 PROCEDURE — 51702 INSERT TEMP BLADDER CATH: CPT

## 2024-03-31 PROCEDURE — 71045 X-RAY EXAM CHEST 1 VIEW: CPT

## 2024-03-31 PROCEDURE — 96365 THER/PROPH/DIAG IV INF INIT: CPT

## 2024-03-31 RX ORDER — NOREPINEPHRINE BITARTRATE 0.06 MG/ML
INJECTION, SOLUTION INTRAVENOUS
Status: DISPENSED
Start: 2024-03-31 | End: 2024-03-31

## 2024-03-31 RX ORDER — NOREPINEPHRINE BITARTRATE 0.06 MG/ML
1-100 INJECTION, SOLUTION INTRAVENOUS CONTINUOUS
Status: DISCONTINUED | OUTPATIENT
Start: 2024-03-31 | End: 2024-03-31 | Stop reason: HOSPADM

## 2024-03-31 RX ADMIN — Medication 100 MCG/MIN: at 00:45
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
## 2024-03-31 NOTE — RT PROTOCOL NOTE
Patient loaded onto EMS stretcher and stable upon transfer to higher level of care. Calvin Stubbs RRT